# Patient Record
Sex: FEMALE | Race: WHITE | NOT HISPANIC OR LATINO | URBAN - METROPOLITAN AREA
[De-identification: names, ages, dates, MRNs, and addresses within clinical notes are randomized per-mention and may not be internally consistent; named-entity substitution may affect disease eponyms.]

---

## 2018-07-12 ENCOUNTER — NEW PATIENT (OUTPATIENT)
Dept: URBAN - METROPOLITAN AREA CLINIC 85 | Facility: CLINIC | Age: 83
End: 2018-07-12
Payer: MEDICARE

## 2018-07-12 DIAGNOSIS — H35.3131 NONEXUDATIVE MACULAR DEGENERATION, EARLY DRY STAGE, BILATERAL: ICD-10-CM

## 2018-07-12 PROCEDURE — 76514 ECHO EXAM OF EYE THICKNESS: CPT | Performed by: OPTOMETRIST

## 2018-07-12 PROCEDURE — 92004 COMPRE OPH EXAM NEW PT 1/>: CPT | Performed by: OPTOMETRIST

## 2018-07-12 ASSESSMENT — VISUAL ACUITY
OS: 20/30
OD: 20/40

## 2018-07-12 ASSESSMENT — INTRAOCULAR PRESSURE
OS: 19
OD: 25

## 2018-07-31 ENCOUNTER — NEW PATIENT (OUTPATIENT)
Dept: URBAN - METROPOLITAN AREA CLINIC 85 | Facility: CLINIC | Age: 83
End: 2018-07-31
Payer: MEDICARE

## 2018-07-31 PROCEDURE — 92002 INTRM OPH EXAM NEW PATIENT: CPT | Performed by: OPHTHALMOLOGY

## 2018-07-31 ASSESSMENT — INTRAOCULAR PRESSURE
OS: 16
OD: 18

## 2018-08-15 ENCOUNTER — Encounter (OUTPATIENT)
Dept: URBAN - METROPOLITAN AREA CLINIC 85 | Facility: CLINIC | Age: 83
End: 2018-08-15
Payer: MEDICARE

## 2018-08-15 DIAGNOSIS — Z01.818 ENCOUNTER FOR OTHER PREPROCEDURAL EXAMINATION: Primary | ICD-10-CM

## 2018-08-15 PROCEDURE — 99213 OFFICE O/P EST LOW 20 MIN: CPT | Performed by: PHYSICIAN ASSISTANT

## 2018-08-30 ENCOUNTER — POST OP (OUTPATIENT)
Dept: URBAN - METROPOLITAN AREA CLINIC 85 | Facility: CLINIC | Age: 83
End: 2018-08-30

## 2018-08-30 ENCOUNTER — SURGERY (OUTPATIENT)
Dept: URBAN - METROPOLITAN AREA SURGERY 55 | Facility: SURGERY | Age: 83
End: 2018-08-30
Payer: MEDICARE

## 2018-08-30 PROCEDURE — 65855 TRABECULOPLASTY LASER SURG: CPT | Performed by: OPHTHALMOLOGY

## 2018-08-30 PROCEDURE — 99024 POSTOP FOLLOW-UP VISIT: CPT | Performed by: OPTOMETRIST

## 2018-08-30 ASSESSMENT — INTRAOCULAR PRESSURE
OD: 20
OD: 20

## 2018-09-13 ENCOUNTER — POST OP (OUTPATIENT)
Dept: URBAN - METROPOLITAN AREA CLINIC 85 | Facility: CLINIC | Age: 83
End: 2018-09-13

## 2018-09-13 ENCOUNTER — SURGERY (OUTPATIENT)
Dept: URBAN - METROPOLITAN AREA SURGERY 55 | Facility: SURGERY | Age: 83
End: 2018-09-13
Payer: MEDICARE

## 2018-09-13 PROCEDURE — 65855 TRABECULOPLASTY LASER SURG: CPT | Performed by: OPHTHALMOLOGY

## 2018-09-13 PROCEDURE — 99024 POSTOP FOLLOW-UP VISIT: CPT | Performed by: OPTOMETRIST

## 2018-09-13 ASSESSMENT — INTRAOCULAR PRESSURE
OS: 9
OS: 9

## 2018-10-24 ENCOUNTER — FOLLOW UP ESTABLISHED (OUTPATIENT)
Dept: URBAN - METROPOLITAN AREA CLINIC 85 | Facility: CLINIC | Age: 83
End: 2018-10-24
Payer: MEDICARE

## 2018-10-24 PROCEDURE — 92012 INTRM OPH EXAM EST PATIENT: CPT | Performed by: OPTOMETRIST

## 2018-10-24 ASSESSMENT — INTRAOCULAR PRESSURE
OS: 12
OD: 14

## 2019-06-12 ENCOUNTER — FOLLOW UP ESTABLISHED (OUTPATIENT)
Dept: URBAN - METROPOLITAN AREA CLINIC 85 | Facility: CLINIC | Age: 84
End: 2019-06-12
Payer: MEDICARE

## 2019-06-12 PROCEDURE — 92012 INTRM OPH EXAM EST PATIENT: CPT | Performed by: OPHTHALMOLOGY

## 2019-06-12 ASSESSMENT — INTRAOCULAR PRESSURE
OD: 12
OS: 12

## 2019-08-26 ENCOUNTER — Encounter (OUTPATIENT)
Dept: URBAN - METROPOLITAN AREA CLINIC 85 | Facility: CLINIC | Age: 84
End: 2019-08-26
Payer: MEDICARE

## 2019-08-26 PROCEDURE — 99213 OFFICE O/P EST LOW 20 MIN: CPT | Performed by: PHYSICIAN ASSISTANT

## 2019-09-05 ENCOUNTER — SURGERY (OUTPATIENT)
Dept: URBAN - METROPOLITAN AREA SURGERY 55 | Facility: SURGERY | Age: 84
End: 2019-09-05
Payer: MEDICARE

## 2019-09-05 ENCOUNTER — POST OP (OUTPATIENT)
Dept: URBAN - METROPOLITAN AREA CLINIC 85 | Facility: CLINIC | Age: 84
End: 2019-09-05

## 2019-09-05 PROCEDURE — 99024 POSTOP FOLLOW-UP VISIT: CPT | Performed by: OPTOMETRIST

## 2019-09-05 PROCEDURE — 65855 TRABECULOPLASTY LASER SURG: CPT | Performed by: OPHTHALMOLOGY

## 2019-09-05 ASSESSMENT — INTRAOCULAR PRESSURE
OD: 12
OD: 12

## 2019-09-12 ENCOUNTER — POST OP (OUTPATIENT)
Dept: URBAN - METROPOLITAN AREA CLINIC 85 | Facility: CLINIC | Age: 84
End: 2019-09-12

## 2019-09-12 ENCOUNTER — SURGERY (OUTPATIENT)
Dept: URBAN - METROPOLITAN AREA SURGERY 55 | Facility: SURGERY | Age: 84
End: 2019-09-12
Payer: MEDICARE

## 2019-09-12 PROCEDURE — 65855 TRABECULOPLASTY LASER SURG: CPT | Performed by: OPHTHALMOLOGY

## 2019-09-12 PROCEDURE — 99024 POSTOP FOLLOW-UP VISIT: CPT | Performed by: OPTOMETRIST

## 2019-09-12 ASSESSMENT — INTRAOCULAR PRESSURE
OS: 12
OS: 12

## 2019-10-22 ENCOUNTER — FOLLOW UP ESTABLISHED (OUTPATIENT)
Dept: URBAN - METROPOLITAN AREA CLINIC 85 | Facility: CLINIC | Age: 84
End: 2019-10-22
Payer: MEDICARE

## 2019-10-22 PROCEDURE — 92012 INTRM OPH EXAM EST PATIENT: CPT | Performed by: OPHTHALMOLOGY

## 2019-10-22 RX ORDER — LATANOPROST 50 UG/ML
0.005 % SOLUTION OPHTHALMIC
Qty: 1 | Refills: 6 | Status: INACTIVE
Start: 2019-10-22 | End: 2019-12-02

## 2019-10-22 ASSESSMENT — INTRAOCULAR PRESSURE
OS: 13
OD: 14

## 2020-02-25 ENCOUNTER — FOLLOW UP ESTABLISHED (OUTPATIENT)
Dept: URBAN - METROPOLITAN AREA CLINIC 85 | Facility: CLINIC | Age: 85
End: 2020-02-25
Payer: MEDICARE

## 2020-02-25 PROCEDURE — 92134 CPTRZ OPH DX IMG PST SGM RTA: CPT | Performed by: OPHTHALMOLOGY

## 2020-02-25 PROCEDURE — 92083 EXTENDED VISUAL FIELD XM: CPT | Performed by: OPHTHALMOLOGY

## 2020-02-25 PROCEDURE — 92014 COMPRE OPH EXAM EST PT 1/>: CPT | Performed by: OPHTHALMOLOGY

## 2020-02-25 PROCEDURE — 92133 CPTRZD OPH DX IMG PST SGM ON: CPT | Performed by: OPHTHALMOLOGY

## 2020-02-25 RX ORDER — LATANOPROST 50 UG/ML
0.005 % SOLUTION OPHTHALMIC
Qty: 3 | Refills: 4 | Status: INACTIVE
Start: 2020-02-25 | End: 2020-08-19

## 2020-02-25 ASSESSMENT — INTRAOCULAR PRESSURE
OS: 16
OD: 18

## 2020-02-25 ASSESSMENT — VISUAL ACUITY
OD: 20/50
OS: 20/40

## 2020-05-19 ENCOUNTER — FOLLOW UP ESTABLISHED (OUTPATIENT)
Dept: URBAN - METROPOLITAN AREA CLINIC 85 | Facility: CLINIC | Age: 85
End: 2020-05-19
Payer: MEDICARE

## 2020-05-19 PROCEDURE — 92012 INTRM OPH EXAM EST PATIENT: CPT | Performed by: OPHTHALMOLOGY

## 2020-05-19 ASSESSMENT — INTRAOCULAR PRESSURE
OS: 16
OD: 19

## 2020-09-01 ENCOUNTER — FOLLOW UP ESTABLISHED (OUTPATIENT)
Dept: URBAN - METROPOLITAN AREA CLINIC 85 | Facility: CLINIC | Age: 85
End: 2020-09-01
Payer: MEDICARE

## 2020-09-01 PROCEDURE — 92012 INTRM OPH EXAM EST PATIENT: CPT | Performed by: OPTOMETRIST

## 2020-09-01 ASSESSMENT — INTRAOCULAR PRESSURE
OS: 16
OD: 19

## 2020-09-08 ENCOUNTER — Encounter (OUTPATIENT)
Dept: URBAN - METROPOLITAN AREA CLINIC 85 | Facility: CLINIC | Age: 85
End: 2020-09-08
Payer: MEDICARE

## 2020-09-08 PROCEDURE — 99213 OFFICE O/P EST LOW 20 MIN: CPT | Performed by: PHYSICIAN ASSISTANT

## 2020-09-28 ENCOUNTER — POST OP (OUTPATIENT)
Dept: URBAN - METROPOLITAN AREA CLINIC 85 | Facility: CLINIC | Age: 85
End: 2020-09-28
Payer: MEDICARE

## 2020-09-28 ENCOUNTER — SURGERY (OUTPATIENT)
Dept: URBAN - METROPOLITAN AREA SURGERY 55 | Facility: SURGERY | Age: 85
End: 2020-09-28
Payer: MEDICARE

## 2020-09-28 PROCEDURE — 65855 TRABECULOPLASTY LASER SURG: CPT | Performed by: OPHTHALMOLOGY

## 2020-09-28 PROCEDURE — 99024 POSTOP FOLLOW-UP VISIT: CPT | Performed by: OPTOMETRIST

## 2020-09-28 ASSESSMENT — INTRAOCULAR PRESSURE
OD: 14
OD: 14

## 2020-10-05 ENCOUNTER — SURGERY (OUTPATIENT)
Dept: URBAN - METROPOLITAN AREA SURGERY 55 | Facility: SURGERY | Age: 85
End: 2020-10-05
Payer: MEDICARE

## 2020-10-05 ENCOUNTER — POST OP (OUTPATIENT)
Dept: URBAN - METROPOLITAN AREA CLINIC 85 | Facility: CLINIC | Age: 85
End: 2020-10-05
Payer: MEDICARE

## 2020-10-05 PROCEDURE — 65855 TRABECULOPLASTY LASER SURG: CPT | Performed by: OPHTHALMOLOGY

## 2020-10-05 PROCEDURE — 99024 POSTOP FOLLOW-UP VISIT: CPT | Performed by: OPTOMETRIST

## 2020-10-05 ASSESSMENT — INTRAOCULAR PRESSURE
OS: 9
OS: 9

## 2020-11-16 ENCOUNTER — FOLLOW UP ESTABLISHED (OUTPATIENT)
Dept: URBAN - METROPOLITAN AREA CLINIC 85 | Facility: CLINIC | Age: 85
End: 2020-11-16
Payer: MEDICARE

## 2020-11-16 PROCEDURE — 92012 INTRM OPH EXAM EST PATIENT: CPT | Performed by: OPTOMETRIST

## 2020-11-16 ASSESSMENT — INTRAOCULAR PRESSURE
OS: 14
OD: 16

## 2021-01-18 ENCOUNTER — FOLLOW UP ESTABLISHED (OUTPATIENT)
Dept: URBAN - METROPOLITAN AREA CLINIC 85 | Facility: CLINIC | Age: 86
End: 2021-01-18
Payer: MEDICARE

## 2021-01-18 PROCEDURE — 92134 CPTRZ OPH DX IMG PST SGM RTA: CPT | Performed by: OPTOMETRIST

## 2021-01-18 PROCEDURE — 92014 COMPRE OPH EXAM EST PT 1/>: CPT | Performed by: OPTOMETRIST

## 2021-01-18 RX ORDER — BRIMONIDINE TARTRATE 2 MG/ML
0.2 % SOLUTION/ DROPS OPHTHALMIC
Qty: 1 | Refills: 2 | Status: INACTIVE
Start: 2021-01-18 | End: 2021-06-08

## 2021-01-18 RX ORDER — LATANOPROST 50 UG/ML
0.005 % SOLUTION OPHTHALMIC
Qty: 3 | Refills: 3 | Status: INACTIVE
Start: 2021-01-18 | End: 2021-09-23

## 2021-01-18 ASSESSMENT — VISUAL ACUITY
OD: 20/60
OS: 20/30

## 2021-01-18 ASSESSMENT — INTRAOCULAR PRESSURE
OD: 18
OS: 17

## 2021-03-01 ENCOUNTER — FOLLOW UP ESTABLISHED (OUTPATIENT)
Dept: URBAN - METROPOLITAN AREA CLINIC 85 | Facility: CLINIC | Age: 86
End: 2021-03-01
Payer: MEDICARE

## 2021-03-01 PROCEDURE — 92134 CPTRZ OPH DX IMG PST SGM RTA: CPT | Performed by: OPTOMETRIST

## 2021-03-01 PROCEDURE — 92014 COMPRE OPH EXAM EST PT 1/>: CPT | Performed by: OPTOMETRIST

## 2021-03-01 ASSESSMENT — INTRAOCULAR PRESSURE
OS: 15
OD: 15

## 2021-05-03 ENCOUNTER — OFFICE VISIT (OUTPATIENT)
Dept: URBAN - METROPOLITAN AREA CLINIC 85 | Facility: CLINIC | Age: 86
End: 2021-05-03
Payer: MEDICARE

## 2021-05-03 PROCEDURE — 92012 INTRM OPH EXAM EST PATIENT: CPT | Performed by: OPTOMETRIST

## 2021-05-03 ASSESSMENT — INTRAOCULAR PRESSURE
OS: 15
OD: 17

## 2021-05-03 ASSESSMENT — VISUAL ACUITY
OS: 20/40
OD: CF 1FT

## 2021-05-03 NOTE — IMPRESSION/PLAN
Impression: Primary open-angle glaucoma, severe stage, bilateral Plan: Discussed end stage glaucoma OD. Continue Brimonidine OU BID. Continue latanoprost QHS OU. Ideally would like to see IOP lower. RTC soon for SLT with Dr. Sahil Snowden.

## 2021-05-12 ENCOUNTER — ADULT PHYSICAL (OUTPATIENT)
Dept: URBAN - METROPOLITAN AREA CLINIC 85 | Facility: CLINIC | Age: 86
End: 2021-05-12
Payer: MEDICARE

## 2021-05-12 DIAGNOSIS — H40.1133 PRIMARY OPEN-ANGLE GLAUCOMA, BILATERAL, SEVERE STAGE: ICD-10-CM

## 2021-05-12 PROCEDURE — 99213 OFFICE O/P EST LOW 20 MIN: CPT | Performed by: NURSE PRACTITIONER

## 2021-05-19 ENCOUNTER — POST-OPERATIVE VISIT (OUTPATIENT)
Dept: URBAN - METROPOLITAN AREA CLINIC 85 | Facility: CLINIC | Age: 86
End: 2021-05-19

## 2021-05-19 ENCOUNTER — SURGERY (OUTPATIENT)
Dept: URBAN - METROPOLITAN AREA SURGERY 55 | Facility: SURGERY | Age: 86
End: 2021-05-19
Payer: MEDICARE

## 2021-05-19 PROCEDURE — 65855 TRABECULOPLASTY LASER SURG: CPT | Performed by: OPHTHALMOLOGY

## 2021-05-19 PROCEDURE — 99024 POSTOP FOLLOW-UP VISIT: CPT | Performed by: OPTOMETRIST

## 2021-05-19 RX ORDER — KETOROLAC TROMETHAMINE 5 MG/ML
0.5 % SOLUTION OPHTHALMIC
Qty: 5 | Refills: 0 | Status: INACTIVE
Start: 2021-05-19 | End: 2021-08-17

## 2021-05-19 ASSESSMENT — INTRAOCULAR PRESSURE
OD: 15
OD: 15

## 2021-06-02 ENCOUNTER — POST-OPERATIVE VISIT (OUTPATIENT)
Dept: URBAN - METROPOLITAN AREA CLINIC 85 | Facility: CLINIC | Age: 86
End: 2021-06-02
Payer: MEDICARE

## 2021-06-02 ENCOUNTER — SURGERY (OUTPATIENT)
Dept: URBAN - METROPOLITAN AREA SURGERY 55 | Facility: SURGERY | Age: 86
End: 2021-06-02
Payer: MEDICARE

## 2021-06-02 DIAGNOSIS — Z48.810 ENCOUNTER FOR SURGICAL AFTERCARE FOLLOWING SURGERY ON THE SENSE ORGANS: Primary | ICD-10-CM

## 2021-06-02 DIAGNOSIS — H40.1123 PRIMARY OPEN-ANGLE GLAUCOMA, LEFT EYE, SEVERE STAGE: Primary | ICD-10-CM

## 2021-06-02 PROCEDURE — 65855 TRABECULOPLASTY LASER SURG: CPT | Performed by: OPHTHALMOLOGY

## 2021-06-02 PROCEDURE — 99024 POSTOP FOLLOW-UP VISIT: CPT | Performed by: OPTOMETRIST

## 2021-06-02 ASSESSMENT — INTRAOCULAR PRESSURE
OS: 10
OS: 10

## 2021-07-14 ENCOUNTER — OFFICE VISIT (OUTPATIENT)
Dept: URBAN - METROPOLITAN AREA CLINIC 85 | Facility: CLINIC | Age: 86
End: 2021-07-14
Payer: MEDICARE

## 2021-07-14 PROCEDURE — 92012 INTRM OPH EXAM EST PATIENT: CPT | Performed by: OPTOMETRIST

## 2021-07-14 RX ORDER — DORZOLAMIDE HCL 20 MG/ML
2 % SOLUTION/ DROPS OPHTHALMIC
Qty: 1 | Refills: 3 | Status: INACTIVE
Start: 2021-07-14 | End: 2022-02-07

## 2021-07-14 ASSESSMENT — INTRAOCULAR PRESSURE
OD: 19
OS: 15

## 2021-07-14 NOTE — IMPRESSION/PLAN
Impression: Primary open-angle glaucoma, severe stage, bilateral Plan: Discussed end stage glaucoma OD. Continue Brimonidine OU BID. Continue latanoprost QHS OU. Pt. reports significant issues with shortness of breath and will be starting supplemental oxygen soon, so will avoid topical beta blocker. Ideally would like to see IOP lower. Initiate dorzolamide TID OD. RTC 1 month IOP and consider Rhopressa.

## 2021-08-17 ENCOUNTER — OFFICE VISIT (OUTPATIENT)
Dept: URBAN - METROPOLITAN AREA CLINIC 85 | Facility: CLINIC | Age: 86
End: 2021-08-17
Payer: MEDICARE

## 2021-08-17 PROCEDURE — 76514 ECHO EXAM OF EYE THICKNESS: CPT | Performed by: OPTOMETRIST

## 2021-08-17 PROCEDURE — 92012 INTRM OPH EXAM EST PATIENT: CPT | Performed by: OPTOMETRIST

## 2021-08-17 ASSESSMENT — INTRAOCULAR PRESSURE
OD: 19
OS: 18

## 2021-08-17 NOTE — IMPRESSION/PLAN
Impression: Nonexudative macular degeneration, intermediate dry stage, bilateral Plan: Macular degeneration dry type, with stable vision. Patient to continue the  use of the Amsler grid and will continue monitoring vision.  recommends taking AREDS vitamins and antioxidants as they have been shown to reduce the progression of macular degeneration. Patient was told to call office immediately with vision changes.

## 2021-08-17 NOTE — IMPRESSION/PLAN
Impression: Primary open-angle glaucoma, severe stage, bilateral Plan: Discussed end stage glaucoma OD. Continue Brimonidine OU BID, Dorzolamide OU BID and latanoprost QHS OU. Initiate Rhopressa QHS OD. Reviewed side effects of Rhopressa. Pt. reports significant issues with shortness of breath and will be starting supplemental oxygen soon, so will avoid topical beta blocker. Ideally would like to see IOP lower. RTC 6 weeks for IOP check, AR and refract or ASAP if decreased VA, pian or worsening of condition.

## 2021-09-28 ENCOUNTER — OFFICE VISIT (OUTPATIENT)
Dept: URBAN - METROPOLITAN AREA CLINIC 85 | Facility: CLINIC | Age: 86
End: 2021-09-28
Payer: MEDICARE

## 2021-09-28 PROCEDURE — 92012 INTRM OPH EXAM EST PATIENT: CPT | Performed by: OPTOMETRIST

## 2021-09-28 ASSESSMENT — VISUAL ACUITY
OS: 20/40
OD: HM

## 2021-09-28 ASSESSMENT — INTRAOCULAR PRESSURE
OD: 12
OS: 11

## 2021-09-28 NOTE — IMPRESSION/PLAN
Impression: Primary open-angle glaucoma, severe stage, bilateral Plan: Discussed end stage glaucoma OD. Continue Brimonidine OU BID, Dorzolamide OU BID,  latanoprost QHS OU and  Rhopressa QHS OU. Reviewed side effects of Rhopressa. Pt. reports significant issues with shortness of breath and will be starting supplemental oxygen soon, so will avoid topical beta blocker. RTC 4 months for CEE with possible RNFL OCT.

## 2021-10-25 ENCOUNTER — OFFICE VISIT (OUTPATIENT)
Dept: URBAN - METROPOLITAN AREA CLINIC 85 | Facility: CLINIC | Age: 86
End: 2021-10-25
Payer: MEDICARE

## 2021-10-25 DIAGNOSIS — H52.4 PRESBYOPIA: ICD-10-CM

## 2021-10-25 PROCEDURE — 92014 COMPRE OPH EXAM EST PT 1/>: CPT | Performed by: OPTOMETRIST

## 2021-10-25 PROCEDURE — 92134 CPTRZ OPH DX IMG PST SGM RTA: CPT | Performed by: OPTOMETRIST

## 2021-10-25 ASSESSMENT — VISUAL ACUITY
OS: 20/70
OD: CF 1FT

## 2021-10-25 ASSESSMENT — INTRAOCULAR PRESSURE
OD: 12
OS: 11

## 2021-10-25 NOTE — IMPRESSION/PLAN
Impression: Primary open-angle glaucoma, severe stage, bilateral Plan: Discussed end stage glaucoma OD. Continue Brimonidine OU BID, Dorzolamide OU BID,  latanoprost QHS OU and  Rhopressa QHS OU. Reviewed side effects of Rhopressa.   Pt. reports significant issues with shortness of breath and will be starting supplemental oxygen soon, so will avoid topical beta blocker

## 2022-04-08 ENCOUNTER — OFFICE VISIT (OUTPATIENT)
Dept: URBAN - METROPOLITAN AREA CLINIC 85 | Facility: CLINIC | Age: 87
End: 2022-04-08
Payer: MEDICARE

## 2022-04-08 DIAGNOSIS — H35.3132 NONEXUDATIVE AGE-RELATED MACULAR DEGENERATION, BILATERAL, INTERMEDIATE DRY STAGE: Primary | ICD-10-CM

## 2022-04-08 DIAGNOSIS — H04.123 DRY EYE SYNDROME OF BILATERAL LACRIMAL GLANDS: ICD-10-CM

## 2022-04-08 PROCEDURE — 92014 COMPRE OPH EXAM EST PT 1/>: CPT | Performed by: OPTOMETRIST

## 2022-04-08 PROCEDURE — 92134 CPTRZ OPH DX IMG PST SGM RTA: CPT | Performed by: OPTOMETRIST

## 2022-04-08 RX ORDER — BRIMONIDINE TARTRATE 2 MG/ML
0.2 % SOLUTION/ DROPS OPHTHALMIC
Qty: 1 | Refills: 6 | Status: ACTIVE
Start: 2022-04-08

## 2022-04-08 RX ORDER — DORZOLAMIDE HCL 20 MG/ML
2 % SOLUTION/ DROPS OPHTHALMIC
Qty: 1 | Refills: 4 | Status: INACTIVE
Start: 2022-04-08 | End: 2022-04-08

## 2022-04-08 RX ORDER — DORZOLAMIDE HCL 20 MG/ML
2 % SOLUTION/ DROPS OPHTHALMIC
Qty: 1 | Refills: 4 | Status: ACTIVE
Start: 2022-04-08

## 2022-04-08 RX ORDER — LATANOPROST 50 UG/ML
0.005 % SOLUTION OPHTHALMIC
Qty: 7.5 | Refills: 11 | Status: ACTIVE
Start: 2022-04-08

## 2022-04-08 ASSESSMENT — INTRAOCULAR PRESSURE
OS: 13
OD: 12

## 2022-04-08 ASSESSMENT — VISUAL ACUITY
OS: 20/50
OD: CF

## 2022-04-08 NOTE — IMPRESSION/PLAN
Impression: Dry eye syndrome of bilateral lacrimal glands: H04.123. Plan: Discussed dry eye diagnosis in detail. Recommend Systane Complete QID OU. Discussed prescription medication options such as Restasis and Humberto Pulling in the future. Also discussed potential of punctal plugs/punctal cautery.

## 2022-04-08 NOTE — IMPRESSION/PLAN
Impression: Primary open-angle glaucoma, severe stage, bilateral Plan: Discussed end stage glaucoma OD. Continue Brimonidine BID OU, Dorzolamide BID OU,  Latanoprost QHS OU & Rhopressa QHS OU. Reviewed side effects of Rhopressa. Pt. reports significant issues with shortness of breath and will be starting supplemental oxygen soon, so will avoid topical beta blocker. RTC 4 months IOP check.

## 2022-04-08 NOTE — IMPRESSION/PLAN
Impression: Nonexudative macular degeneration, intermediate dry stage, bilateral Plan: Macular degeneration, dry type. Will continue to monitor vision and the patient has been instructed to call with any vision changes. Continue AREDS II and Amsler Grid (AG) monitoring QD. Pt. given AG for home monitoring. Pt. instructed to call ASAP if acute VA change or change on AG, as that may indicate conversion to exudative macular degeneration.

## 2022-08-11 ENCOUNTER — OFFICE VISIT (OUTPATIENT)
Dept: URBAN - METROPOLITAN AREA CLINIC 85 | Facility: CLINIC | Age: 87
End: 2022-08-11
Payer: MEDICARE

## 2022-08-11 DIAGNOSIS — H35.3132 NONEXUDATIVE AGE-RELATED MACULAR DEGENERATION, BILATERAL, INTERMEDIATE DRY STAGE: ICD-10-CM

## 2022-08-11 DIAGNOSIS — H40.1133 PRIMARY OPEN-ANGLE GLAUCOMA, BILATERAL, SEVERE STAGE: Primary | ICD-10-CM

## 2022-08-11 DIAGNOSIS — H04.123 DRY EYE SYNDROME OF BILATERAL LACRIMAL GLANDS: ICD-10-CM

## 2022-08-11 PROCEDURE — 92012 INTRM OPH EXAM EST PATIENT: CPT | Performed by: OPTOMETRIST

## 2022-08-11 ASSESSMENT — INTRAOCULAR PRESSURE
OD: 12
OS: 11

## 2022-08-11 ASSESSMENT — VISUAL ACUITY: OS: 20/50

## 2022-08-11 NOTE — IMPRESSION/PLAN
Impression: Dry eye syndrome of bilateral lacrimal glands: H04.123. Plan: Discussed dry eye diagnosis in detail. Recommend Systane Complete QID OU. Discussed prescription medication options such as Restasis and Hortensia Corpus in the future. Also discussed potential of punctal plugs/punctal cautery.